# Patient Record
Sex: MALE | Race: WHITE | NOT HISPANIC OR LATINO
[De-identification: names, ages, dates, MRNs, and addresses within clinical notes are randomized per-mention and may not be internally consistent; named-entity substitution may affect disease eponyms.]

---

## 2017-01-03 ENCOUNTER — OTHER (OUTPATIENT)
Age: 32
End: 2017-01-03

## 2017-01-19 ENCOUNTER — OTHER (OUTPATIENT)
Age: 32
End: 2017-01-19

## 2017-05-08 ENCOUNTER — RX RENEWAL (OUTPATIENT)
Age: 32
End: 2017-05-08

## 2017-06-27 ENCOUNTER — LABORATORY RESULT (OUTPATIENT)
Age: 32
End: 2017-06-27

## 2017-06-27 ENCOUNTER — APPOINTMENT (OUTPATIENT)
Dept: SURGERY | Facility: CLINIC | Age: 32
End: 2017-06-27

## 2017-06-28 LAB
T3 SERPL-MCNC: 113 NG/DL
T4 FREE SERPL-MCNC: 1.6 NG/DL
TSH SERPL-ACNC: 0.19 UIU/ML

## 2017-06-29 LAB
THYROGLOB AB SERPL-ACNC: <20 IU/ML
THYROGLOB SERPL-MCNC: 75.2 NG/ML

## 2017-07-07 ENCOUNTER — OTHER (OUTPATIENT)
Age: 32
End: 2017-07-07

## 2017-07-14 ENCOUNTER — OTHER (OUTPATIENT)
Age: 32
End: 2017-07-14

## 2018-01-02 ENCOUNTER — LABORATORY RESULT (OUTPATIENT)
Age: 33
End: 2018-01-02

## 2018-01-02 ENCOUNTER — APPOINTMENT (OUTPATIENT)
Dept: SURGERY | Facility: CLINIC | Age: 33
End: 2018-01-02
Payer: COMMERCIAL

## 2018-01-02 PROCEDURE — 36415 COLL VENOUS BLD VENIPUNCTURE: CPT

## 2018-01-02 PROCEDURE — 99213 OFFICE O/P EST LOW 20 MIN: CPT

## 2018-01-03 LAB
ALBUMIN SERPL ELPH-MCNC: 5.1 G/DL
ALP BLD-CCNC: 94 U/L
ALT SERPL-CCNC: 37 U/L
ANION GAP SERPL CALC-SCNC: 15 MMOL/L
AST SERPL-CCNC: 33 U/L
BASOPHILS # BLD AUTO: 0.01 K/UL
BASOPHILS NFR BLD AUTO: 0.1 %
BILIRUB SERPL-MCNC: 0.2 MG/DL
BUN SERPL-MCNC: 16 MG/DL
CALCIUM SERPL-MCNC: 10.2 MG/DL
CHLORIDE SERPL-SCNC: 101 MMOL/L
CO2 SERPL-SCNC: 25 MMOL/L
CREAT SERPL-MCNC: 1.15 MG/DL
EOSINOPHIL # BLD AUTO: 0.01 K/UL
EOSINOPHIL NFR BLD AUTO: 0.1 %
GLUCOSE SERPL-MCNC: 95 MG/DL
HCT VFR BLD CALC: 47.1 %
HGB BLD-MCNC: 16.2 G/DL
IMM GRANULOCYTES NFR BLD AUTO: 0.1 %
LYMPHOCYTES # BLD AUTO: 1.38 K/UL
LYMPHOCYTES NFR BLD AUTO: 15.8 %
MAN DIFF?: NORMAL
MCHC RBC-ENTMCNC: 31 PG
MCHC RBC-ENTMCNC: 34.4 GM/DL
MCV RBC AUTO: 90.2 FL
MONOCYTES # BLD AUTO: 0.44 K/UL
MONOCYTES NFR BLD AUTO: 5.1 %
NEUTROPHILS # BLD AUTO: 6.86 K/UL
NEUTROPHILS NFR BLD AUTO: 78.8 %
PLATELET # BLD AUTO: 315 K/UL
POTASSIUM SERPL-SCNC: 4.2 MMOL/L
PROT SERPL-MCNC: 7.8 G/DL
RBC # BLD: 5.22 M/UL
RBC # FLD: 13.2 %
SODIUM SERPL-SCNC: 141 MMOL/L
T3 SERPL-MCNC: 109 NG/DL
T4 FREE SERPL-MCNC: 1.5 NG/DL
TSH SERPL-ACNC: 0.5 UIU/ML
WBC # FLD AUTO: 8.71 K/UL

## 2018-01-04 LAB
THYROGLOB AB SERPL-ACNC: <20 IU/ML
THYROGLOB SERPL-MCNC: 81.7 NG/ML

## 2018-01-22 ENCOUNTER — OTHER (OUTPATIENT)
Age: 33
End: 2018-01-22

## 2018-04-03 ENCOUNTER — RX RENEWAL (OUTPATIENT)
Age: 33
End: 2018-04-03

## 2018-07-02 ENCOUNTER — RX RENEWAL (OUTPATIENT)
Age: 33
End: 2018-07-02

## 2018-07-10 ENCOUNTER — LABORATORY RESULT (OUTPATIENT)
Age: 33
End: 2018-07-10

## 2018-07-10 ENCOUNTER — APPOINTMENT (OUTPATIENT)
Dept: SURGERY | Facility: CLINIC | Age: 33
End: 2018-07-10
Payer: COMMERCIAL

## 2018-07-10 PROCEDURE — 36415 COLL VENOUS BLD VENIPUNCTURE: CPT

## 2018-07-10 PROCEDURE — 99213 OFFICE O/P EST LOW 20 MIN: CPT

## 2018-07-12 LAB
T3 SERPL-MCNC: 108 NG/DL
T4 FREE SERPL-MCNC: 1.5 NG/DL
THYROGLOB AB SERPL-ACNC: <20 IU/ML
THYROGLOB SERPL-MCNC: 61.4 NG/ML
TSH SERPL-ACNC: 1.04 UIU/ML

## 2018-07-23 ENCOUNTER — OTHER (OUTPATIENT)
Age: 33
End: 2018-07-23

## 2018-12-17 ENCOUNTER — RX RENEWAL (OUTPATIENT)
Age: 33
End: 2018-12-17

## 2019-01-29 ENCOUNTER — LABORATORY RESULT (OUTPATIENT)
Age: 34
End: 2019-01-29

## 2019-01-29 ENCOUNTER — APPOINTMENT (OUTPATIENT)
Dept: SURGERY | Facility: CLINIC | Age: 34
End: 2019-01-29
Payer: COMMERCIAL

## 2019-01-29 PROCEDURE — 99213 OFFICE O/P EST LOW 20 MIN: CPT

## 2019-01-29 PROCEDURE — 36415 COLL VENOUS BLD VENIPUNCTURE: CPT

## 2019-01-29 NOTE — PHYSICAL EXAM
[de-identified] : changes from prior surgery [de-identified] : no palpable thyroid bed nodules [Laryngoscopy Performed] : laryngoscopy was performed, see procedure section for findings [Midline] : located in midline position [Normal] : orientation to person, place, and time: normal [de-identified] : 1.2 cm right upper jugular lymph node, well circumscribed and mobile and soft and stable

## 2019-01-29 NOTE — ASSESSMENT
[FreeTextEntry1] : bloods drawn. CT chest and sonogram next visit. bloods drawn. to call next week for results.  to return earlier if any change

## 2019-01-29 NOTE — HISTORY OF PRESENT ILLNESS
[de-identified] : 4   years s/p total thyroidectomy with bilateral neck dissection for malignancy. feels well on Synthroid 112 mcg 7 1/2 doses weekly. denies dysphagia, hoarseness or new lesions. no changes medically since last visit.  last CT showed stable pulmonary nodules

## 2019-01-31 LAB
CHOLEST SERPL-MCNC: 188 MG/DL
CHOLEST/HDLC SERPL: 3.5 RATIO
HDLC SERPL-MCNC: 54 MG/DL
LDLC SERPL CALC-MCNC: 119 MG/DL
T3 SERPL-MCNC: 122 NG/DL
T4 FREE SERPL-MCNC: 1.8 NG/DL
THYROGLOB AB SERPL-ACNC: <20 IU/ML
THYROGLOB SERPL-MCNC: 89.2 NG/ML
TRIGL SERPL-MCNC: 73 MG/DL
TSH SERPL-ACNC: 0.67 UIU/ML

## 2019-02-01 ENCOUNTER — RESULT REVIEW (OUTPATIENT)
Age: 34
End: 2019-02-01

## 2019-07-30 ENCOUNTER — LABORATORY RESULT (OUTPATIENT)
Age: 34
End: 2019-07-30

## 2019-07-30 ENCOUNTER — APPOINTMENT (OUTPATIENT)
Dept: SURGERY | Facility: CLINIC | Age: 34
End: 2019-07-30
Payer: COMMERCIAL

## 2019-07-30 PROCEDURE — 36415 COLL VENOUS BLD VENIPUNCTURE: CPT

## 2019-07-30 PROCEDURE — 99213 OFFICE O/P EST LOW 20 MIN: CPT

## 2019-07-30 NOTE — ASSESSMENT
[FreeTextEntry1] : will observe.  bloods drawn. CT chest and sonogram requested. to call next week for results.  to return earlier if any change

## 2019-07-30 NOTE — PHYSICAL EXAM
[de-identified] : changes from prior surgery [de-identified] : no palpable thyroid bed nodules [Laryngoscopy Performed] : laryngoscopy was performed, see procedure section for findings [Midline] : located in midline position [de-identified] : 1.2 cm right upper jugular lymph node, well circumscribed and mobile and soft and stable [Normal] : cranial nerves 2-12 intact

## 2019-07-30 NOTE — HISTORY OF PRESENT ILLNESS
[de-identified] : 4  1/2   years s/p total thyroidectomy with bilateral neck dissection for malignancy. feels well on Synthroid 112 mcg 7 1/2 doses weekly. denies dysphagia, hoarseness or new lesions. no changes medically since last visit.  last CT showed stable pulmonary nodules

## 2019-07-31 LAB
T3 SERPL-MCNC: 117 NG/DL
T4 FREE SERPL-MCNC: 1.4 NG/DL
THYROGLOB AB SERPL-ACNC: <20 IU/ML
THYROGLOB SERPL-MCNC: 84.5 NG/ML
TSH SERPL-ACNC: 1.41 UIU/ML

## 2019-08-12 ENCOUNTER — OTHER (OUTPATIENT)
Age: 34
End: 2019-08-12

## 2019-08-23 ENCOUNTER — RX RENEWAL (OUTPATIENT)
Age: 34
End: 2019-08-23

## 2020-02-04 ENCOUNTER — APPOINTMENT (OUTPATIENT)
Dept: SURGERY | Facility: CLINIC | Age: 35
End: 2020-02-04
Payer: COMMERCIAL

## 2020-02-04 ENCOUNTER — LABORATORY RESULT (OUTPATIENT)
Age: 35
End: 2020-02-04

## 2020-02-04 PROCEDURE — 99213 OFFICE O/P EST LOW 20 MIN: CPT

## 2020-02-04 PROCEDURE — 36415 COLL VENOUS BLD VENIPUNCTURE: CPT

## 2020-02-04 NOTE — PHYSICAL EXAM
[de-identified] : no palpable thyroid bed nodules [de-identified] : changes from prior surgery [Laryngoscopy Performed] : laryngoscopy was performed, see procedure section for findings [Midline] : located in midline position [de-identified] : 1.2 cm right upper jugular lymph node, well circumscribed and mobile and soft and stable [Normal] : orientation to person, place, and time: normal

## 2020-02-04 NOTE — HISTORY OF PRESENT ILLNESS
[de-identified] : 5   years s/p total thyroidectomy with bilateral neck dissection for malignancy. feels well on Synthroid 112 mcg 7 1/2 doses weekly. denies dysphagia, hoarseness or new lesions. no changes medically since last visit.  last CT showed stable pulmonary nodules

## 2020-02-04 NOTE — ASSESSMENT
[FreeTextEntry1] : will observe.  bloods drawn. CT chest next visit. to call next week for results.  to return earlier if any change

## 2020-02-05 LAB
T3 SERPL-MCNC: 111 NG/DL
T4 FREE SERPL-MCNC: 1.5 NG/DL
THYROGLOB AB SERPL-ACNC: <20 IU/ML
THYROGLOB SERPL-MCNC: 82.7 NG/ML
TSH SERPL-ACNC: 1.41 UIU/ML

## 2020-05-18 LAB
T3 SERPL-MCNC: 108 NG/DL
T4 FREE SERPL-MCNC: 1.2 NG/DL
TSH SERPL-ACNC: 5.07 UIU/ML

## 2020-05-19 LAB
THYROGLOB AB SERPL-ACNC: <20 IU/ML
THYROGLOB AB SERPL-ACNC: <20 IU/ML
THYROGLOB SERPL-MCNC: 66.7 NG/ML
THYROPEROXIDASE AB SERPL IA-ACNC: <10 IU/ML

## 2020-06-19 LAB — T3 SERPL-MCNC: 101 NG/DL

## 2020-06-21 ENCOUNTER — LABORATORY RESULT (OUTPATIENT)
Age: 35
End: 2020-06-21

## 2020-06-25 LAB
T4 FREE SERPL-MCNC: 1.6 NG/DL
THYROGLOB AB SERPL-ACNC: <20 IU/ML
THYROGLOB SERPL-MCNC: 84.4 NG/ML
TSH SERPL-ACNC: 2.43 UIU/ML

## 2020-08-04 ENCOUNTER — APPOINTMENT (OUTPATIENT)
Dept: SURGERY | Facility: CLINIC | Age: 35
End: 2020-08-04
Payer: COMMERCIAL

## 2020-08-04 PROCEDURE — 99213 OFFICE O/P EST LOW 20 MIN: CPT

## 2020-08-04 NOTE — ASSESSMENT
[FreeTextEntry1] : will observe.  will increase Synthroid to 137 mcg daily.   bloods 6 weeks. to call next week for results.  to return earlier if any change

## 2020-08-04 NOTE — PHYSICAL EXAM
[de-identified] : no palpable thyroid bed nodules [de-identified] : changes from prior surgery [Midline] : located in midline position [Laryngoscopy Performed] : laryngoscopy was performed, see procedure section for findings [Normal] : cranial nerves 2-12 intact [de-identified] : 1.2 cm right upper jugular lymph node, well circumscribed and mobile and soft and stable

## 2020-08-04 NOTE — HISTORY OF PRESENT ILLNESS
[de-identified] : 5 1/2  years s/p total thyroidectomy with bilateral neck dissection for malignancy. feels well on Synthroid 125 mcg daily. denies dysphagia, hoarseness or new lesions. no changes medically since last visit.  recent CT showed stable pulmonary nodules

## 2020-10-03 ENCOUNTER — LABORATORY RESULT (OUTPATIENT)
Age: 35
End: 2020-10-03

## 2020-10-05 LAB
T3 SERPL-MCNC: 124 NG/DL
T4 FREE SERPL-MCNC: 1.8 NG/DL
THYROGLOB AB SERPL-ACNC: <20 IU/ML
THYROGLOB SERPL-MCNC: 69.4 NG/ML
TSH SERPL-ACNC: 0.1 UIU/ML

## 2021-02-09 ENCOUNTER — LABORATORY RESULT (OUTPATIENT)
Age: 36
End: 2021-02-09

## 2021-02-09 ENCOUNTER — APPOINTMENT (OUTPATIENT)
Dept: SURGERY | Facility: CLINIC | Age: 36
End: 2021-02-09
Payer: MEDICARE

## 2021-02-09 PROCEDURE — 99214 OFFICE O/P EST MOD 30 MIN: CPT

## 2021-02-09 PROCEDURE — 36415 COLL VENOUS BLD VENIPUNCTURE: CPT

## 2021-02-09 NOTE — ASSESSMENT
[FreeTextEntry1] : will observe.  bloods drawn. to call next week for results.  to return earlier if any change.  chest CT next visit

## 2021-02-09 NOTE — HISTORY OF PRESENT ILLNESS
[de-identified] : 6  years s/p total thyroidectomy with bilateral neck dissection for malignancy. feels well on Synthroid 137 mcg daily. denies dysphagia, hoarseness or new lesions. no changes medically since last visit.  last CT showed stable pulmonary nodules.  I have reviewed all old and new data and available images.  Additional information was obtained from others present at the time of visit to ensure the completeness of the history\par

## 2021-02-09 NOTE — PHYSICAL EXAM
[de-identified] : changes from prior surgery [de-identified] : no palpable thyroid bed nodules [Laryngoscopy Performed] : laryngoscopy was performed, see procedure section for findings [Midline] : located in midline position [Normal] : orientation to person, place, and time: normal [de-identified] : 1.2 cm right upper jugular lymph node, well circumscribed and mobile and soft and stable

## 2021-02-10 LAB
T3 SERPL-MCNC: 124 NG/DL
T4 FREE SERPL-MCNC: 1.9 NG/DL
THYROGLOB AB SERPL-ACNC: <20 IU/ML
THYROGLOB SERPL-MCNC: 60.7 NG/ML
TSH SERPL-ACNC: 0.03 UIU/ML

## 2021-02-12 ENCOUNTER — NON-APPOINTMENT (OUTPATIENT)
Age: 36
End: 2021-02-12

## 2021-03-23 ENCOUNTER — NON-APPOINTMENT (OUTPATIENT)
Age: 36
End: 2021-03-23

## 2021-05-03 ENCOUNTER — RX RENEWAL (OUTPATIENT)
Age: 36
End: 2021-05-03

## 2021-08-10 ENCOUNTER — APPOINTMENT (OUTPATIENT)
Dept: SURGERY | Facility: CLINIC | Age: 36
End: 2021-08-10
Payer: MEDICARE

## 2021-08-10 ENCOUNTER — LABORATORY RESULT (OUTPATIENT)
Age: 36
End: 2021-08-10

## 2021-08-10 PROCEDURE — 36415 COLL VENOUS BLD VENIPUNCTURE: CPT

## 2021-08-10 PROCEDURE — 99214 OFFICE O/P EST MOD 30 MIN: CPT | Mod: 25

## 2021-08-10 RX ORDER — CLINDAMYCIN HYDROCHLORIDE 150 MG/1
150 CAPSULE ORAL
Qty: 29 | Refills: 0 | Status: ACTIVE | COMMUNITY
Start: 2021-07-14

## 2021-08-10 NOTE — HISTORY OF PRESENT ILLNESS
[de-identified] : 6 1/2  years s/p total thyroidectomy with bilateral neck dissection for malignancy. feels well on Synthroid 137 mcg daily. denies dysphagia, hoarseness or new lesions. no changes medically since last visit.  recent CT showed stable pulmonary nodules.  I have reviewed all old and new data and available images.  Additional information was obtained from others present at the time of visit to ensure the completeness of the history\par

## 2021-08-10 NOTE — PHYSICAL EXAM
[Laryngoscopy Performed] : laryngoscopy was performed, see procedure section for findings [Midline] : located in midline position [Normal] : orientation to person, place, and time: normal [de-identified] : changes from prior surgery [de-identified] : no palpable thyroid bed nodules [de-identified] : 1.2 cm right upper jugular lymph node, well circumscribed and mobile and soft and stable

## 2021-08-10 NOTE — ASSESSMENT
[FreeTextEntry1] : will observe.  bloods drawn. to call next week for results.  to return earlier if any change.  patient has been given the opportunity to ask questions, and all of the patient's questions have been answered to their satisfaction\par

## 2021-08-11 LAB
T3 SERPL-MCNC: 134 NG/DL
T4 FREE SERPL-MCNC: 1.7 NG/DL
TSH SERPL-ACNC: 0.01 UIU/ML

## 2021-08-12 LAB
THYROGLOB AB SERPL-ACNC: <20 IU/ML
THYROGLOB SERPL-MCNC: 52 NG/ML

## 2021-08-13 ENCOUNTER — NON-APPOINTMENT (OUTPATIENT)
Age: 36
End: 2021-08-13

## 2022-02-17 ENCOUNTER — LABORATORY RESULT (OUTPATIENT)
Age: 37
End: 2022-02-17

## 2022-02-17 ENCOUNTER — APPOINTMENT (OUTPATIENT)
Dept: SURGERY | Facility: CLINIC | Age: 37
End: 2022-02-17
Payer: MEDICARE

## 2022-02-17 LAB
T3 SERPL-MCNC: 119 NG/DL
T4 FREE SERPL-MCNC: 2 NG/DL
TSH SERPL-ACNC: 0.02 UIU/ML

## 2022-02-17 PROCEDURE — 36415 COLL VENOUS BLD VENIPUNCTURE: CPT

## 2022-02-17 PROCEDURE — 99214 OFFICE O/P EST MOD 30 MIN: CPT | Mod: 25

## 2022-02-17 NOTE — HISTORY OF PRESENT ILLNESS
[de-identified] : 7  years s/p total thyroidectomy with bilateral neck dissection for malignancy. feels well on Synthroid 137 mcg daily. denies dysphagia, hoarseness or new lesions. no changes medically since last visit.  last CT showed stable pulmonary nodules.  I have reviewed all old and new data and available images.  Additional information was obtained from others present at the time of visit to ensure the completeness of the history\par

## 2022-02-17 NOTE — ASSESSMENT
[FreeTextEntry1] : will observe.  bloods drawn. to call next week for results.  to return earlier if any change.  patient has been given the opportunity to ask questions, and all of the patient's questions have been answered to their satisfaction.  chest CT next visit\par

## 2022-02-17 NOTE — PHYSICAL EXAM
[de-identified] : changes from prior surgery [de-identified] : no palpable thyroid bed nodules [Laryngoscopy Performed] : laryngoscopy was performed, see procedure section for findings [Midline] : located in midline position [Normal] : orientation to person, place, and time: normal [de-identified] : 1.2 cm right upper jugular lymph node, well circumscribed and mobile and soft and stable

## 2022-02-18 LAB
THYROGLOB AB SERPL-ACNC: <20 IU/ML
THYROGLOB SERPL-MCNC: 55.8 NG/ML

## 2022-02-22 ENCOUNTER — NON-APPOINTMENT (OUTPATIENT)
Age: 37
End: 2022-02-22

## 2022-08-04 RX ORDER — LEVOTHYROXINE SODIUM 0.11 MG/1
112 TABLET ORAL DAILY
Qty: 90 | Refills: 2 | Status: COMPLETED | COMMUNITY
Start: 2018-07-02 | End: 2022-08-04

## 2022-08-04 RX ORDER — LEVOTHYROXINE SODIUM 0.12 MG/1
125 TABLET ORAL DAILY
Qty: 90 | Refills: 3 | Status: COMPLETED | COMMUNITY
Start: 2020-02-11 | End: 2022-08-04

## 2022-08-16 ENCOUNTER — LABORATORY RESULT (OUTPATIENT)
Age: 37
End: 2022-08-16

## 2022-08-16 ENCOUNTER — APPOINTMENT (OUTPATIENT)
Dept: SURGERY | Facility: CLINIC | Age: 37
End: 2022-08-16

## 2022-08-16 PROCEDURE — 36415 COLL VENOUS BLD VENIPUNCTURE: CPT

## 2022-08-16 PROCEDURE — 99214 OFFICE O/P EST MOD 30 MIN: CPT | Mod: 25

## 2022-08-16 RX ORDER — NIRMATRELVIR AND RITONAVIR 300-100 MG
20 X 150 MG & KIT ORAL
Qty: 30 | Refills: 0 | Status: ACTIVE | COMMUNITY
Start: 2022-08-06

## 2022-08-16 NOTE — HISTORY OF PRESENT ILLNESS
[de-identified] : 7 1/2  years s/p total thyroidectomy with bilateral neck dissection for malignancy. feels well on Synthroid 137 mcg daily. denies dysphagia, hoarseness or new lesions. no changes medically since last visit.  recent CT showed stable pulmonary nodules.  I have reviewed all old and new data and available images.  Additional information was obtained from others present at the time of visit to ensure the completeness of the history.  fully recovered from recent Covid infection\par

## 2022-08-16 NOTE — PHYSICAL EXAM
[de-identified] : changes from prior surgery [de-identified] : no palpable thyroid bed nodules [Laryngoscopy Performed] : laryngoscopy was performed, see procedure section for findings [Midline] : located in midline position [Normal] : orientation to person, place, and time: normal [de-identified] : 1.2 cm right upper jugular lymph node, well circumscribed and mobile and soft and stable

## 2022-08-16 NOTE — ASSESSMENT
[FreeTextEntry1] : will observe.  bloods drawn. to call next week for results.  to return earlier if any change.  patient has been given the opportunity to ask questions, and all of the patient's questions have been answered to their satisfaction.  chest CT next year\par

## 2022-08-17 LAB
T3 SERPL-MCNC: 90 NG/DL
T4 FREE SERPL-MCNC: 1.3 NG/DL
THYROGLOB AB SERPL-ACNC: <20 IU/ML
THYROGLOB SERPL-MCNC: 77.8 NG/ML
TSH SERPL-ACNC: 4.27 UIU/ML

## 2022-08-18 ENCOUNTER — NON-APPOINTMENT (OUTPATIENT)
Age: 37
End: 2022-08-18

## 2022-10-22 ENCOUNTER — LABORATORY RESULT (OUTPATIENT)
Age: 37
End: 2022-10-22

## 2022-10-24 ENCOUNTER — NON-APPOINTMENT (OUTPATIENT)
Age: 37
End: 2022-10-24

## 2022-10-24 LAB
T3 SERPL-MCNC: 98 NG/DL
T4 FREE SERPL-MCNC: 1.6 NG/DL
THYROGLOB AB SERPL-ACNC: <20 IU/ML
THYROGLOB SERPL-MCNC: 61.5 NG/ML
TSH SERPL-ACNC: 0.48 UIU/ML

## 2022-10-26 ENCOUNTER — NON-APPOINTMENT (OUTPATIENT)
Age: 37
End: 2022-10-26

## 2023-02-28 ENCOUNTER — APPOINTMENT (OUTPATIENT)
Dept: SURGERY | Facility: CLINIC | Age: 38
End: 2023-02-28
Payer: MEDICARE

## 2023-02-28 ENCOUNTER — LABORATORY RESULT (OUTPATIENT)
Age: 38
End: 2023-02-28

## 2023-02-28 DIAGNOSIS — Z00.00 ENCOUNTER FOR GENERAL ADULT MEDICAL EXAMINATION W/OUT ABNORMAL FINDINGS: ICD-10-CM

## 2023-02-28 PROCEDURE — 99214 OFFICE O/P EST MOD 30 MIN: CPT | Mod: 25

## 2023-02-28 PROCEDURE — 36415 COLL VENOUS BLD VENIPUNCTURE: CPT

## 2023-02-28 NOTE — HISTORY OF PRESENT ILLNESS
[de-identified] : 8   years s/p total thyroidectomy with bilateral neck dissection for malignancy. feels well on Synthroid 137 mcg   7 1/2 doses weekly. denies dysphagia, hoarseness or new lesions. no changes medically since last visit.  last CT showed stable pulmonary nodules.  I have reviewed all old and new data and available images.  Additional information was obtained from others present at the time of visit to ensure the completeness of the history.

## 2023-02-28 NOTE — PHYSICAL EXAM
[de-identified] : changes from prior surgery [de-identified] : no palpable thyroid bed nodules [Laryngoscopy Performed] : laryngoscopy was performed, see procedure section for findings [Midline] : located in midline position [Normal] : orientation to person, place, and time: normal [de-identified] : 1.2 cm right upper jugular lymph node, well circumscribed and mobile and soft and stable

## 2023-03-01 LAB
T3 SERPL-MCNC: 111 NG/DL
T4 FREE SERPL-MCNC: 1.5 NG/DL
THYROGLOB AB SERPL-ACNC: <20 IU/ML
THYROGLOB SERPL-MCNC: 49.8 NG/ML
TSH SERPL-ACNC: 1.14 UIU/ML

## 2023-03-07 ENCOUNTER — NON-APPOINTMENT (OUTPATIENT)
Age: 38
End: 2023-03-07

## 2023-04-03 ENCOUNTER — RX RENEWAL (OUTPATIENT)
Age: 38
End: 2023-04-03

## 2023-08-08 ENCOUNTER — LABORATORY RESULT (OUTPATIENT)
Age: 38
End: 2023-08-08

## 2023-08-08 ENCOUNTER — APPOINTMENT (OUTPATIENT)
Dept: SURGERY | Facility: CLINIC | Age: 38
End: 2023-08-08
Payer: MEDICARE

## 2023-08-08 PROCEDURE — 99214 OFFICE O/P EST MOD 30 MIN: CPT | Mod: 25

## 2023-08-08 PROCEDURE — 36415 COLL VENOUS BLD VENIPUNCTURE: CPT

## 2023-08-08 NOTE — PHYSICAL EXAM
[de-identified] : changes from prior surgery [de-identified] : no palpable thyroid bed nodules [Laryngoscopy Performed] : laryngoscopy was performed, see procedure section for findings [Midline] : located in midline position [Normal] : orientation to person, place, and time: normal [de-identified] : 1.2 cm right upper jugular lymph node, well circumscribed and mobile and soft and stable

## 2023-08-08 NOTE — HISTORY OF PRESENT ILLNESS
[de-identified] : 8 1/2   years s/p total thyroidectomy with bilateral neck dissection for malignancy. feels well on Synthroid 137 mcg   7 1/2 doses weekly. denies dysphagia, hoarseness or new lesions. no changes medically since last visit.  recent CT showed stable pulmonary nodules, osteopenia.  I have reviewed all old and new data and available images.  Additional information was obtained from others present at the time of visit to ensure the completeness of the history.

## 2023-08-08 NOTE — ASSESSMENT
[FreeTextEntry1] : will observe.  bloods drawn. to call next week for results.  to return earlier if any change.  patient has been given the opportunity to ask questions, and all of the patient's questions have been answered to their satisfaction.  chest CT next year. DEXA requested. to call next week for results.  asked about seeing endocrinologist. discussed is reasonable idea in view of osteopenia

## 2023-08-09 LAB
25(OH)D3 SERPL-MCNC: 38.4 NG/ML
CALCIUM SERPL-MCNC: 10 MG/DL
CALCIUM SERPL-MCNC: 10 MG/DL
PARATHYROID HORMONE INTACT: 25 PG/ML
T3 SERPL-MCNC: 110 NG/DL
T4 FREE SERPL-MCNC: 1.5 NG/DL
THYROGLOB AB SERPL-ACNC: <20 IU/ML
THYROGLOB SERPL-MCNC: 42.3 NG/ML
TSH SERPL-ACNC: 1.94 UIU/ML

## 2023-09-06 ENCOUNTER — APPOINTMENT (OUTPATIENT)
Dept: ENDOCRINOLOGY | Facility: CLINIC | Age: 38
End: 2023-09-06
Payer: MEDICARE

## 2023-09-06 VITALS
OXYGEN SATURATION: 99 % | WEIGHT: 130 LBS | BODY MASS INDEX: 21.66 KG/M2 | HEART RATE: 93 BPM | DIASTOLIC BLOOD PRESSURE: 76 MMHG | SYSTOLIC BLOOD PRESSURE: 100 MMHG | HEIGHT: 65 IN

## 2023-09-06 DIAGNOSIS — M81.0 AGE-RELATED OSTEOPOROSIS W/OUT CURRENT PATHOLOGICAL FRACTURE: ICD-10-CM

## 2023-09-06 LAB
ALBUMIN SERPL ELPH-MCNC: 5 G/DL
ALP BLD-CCNC: 102 U/L
ALT SERPL-CCNC: 29 U/L
ANION GAP SERPL CALC-SCNC: 14 MMOL/L
AST SERPL-CCNC: 28 U/L
BILIRUB SERPL-MCNC: 0.5 MG/DL
BUN SERPL-MCNC: 16 MG/DL
CALCIUM SERPL-MCNC: 9.8 MG/DL
CHLORIDE SERPL-SCNC: 101 MMOL/L
CO2 SERPL-SCNC: 25 MMOL/L
CREAT SERPL-MCNC: 1.03 MG/DL
EGFR: 96 ML/MIN/1.73M2
GLUCOSE SERPL-MCNC: 80 MG/DL
POTASSIUM SERPL-SCNC: 4.4 MMOL/L
PROT SERPL-MCNC: 7.2 G/DL
SODIUM SERPL-SCNC: 139 MMOL/L

## 2023-09-06 PROCEDURE — 99205 OFFICE O/P NEW HI 60 MIN: CPT

## 2023-09-06 NOTE — ASSESSMENT
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   1. Thyroid Cancer:  Surgery: Right lobectomy 2014 for 3.8cm unifocal papillary thyroid cancer with lymph node involvement and extension into the perithyroidal soft tissue and muscle (pT4a p N1B Mx). Left lobectomy on 2014 showing papillary carcinoma, extending to perithyroidal soft tissue and muscle. 2014 right neck lymph node excision which showed metastatic papillary carcinoma with carcinomatosis completely replacing lymph node. Surgeries performed by Dr. Ya. MARSHA Risk: high risk with incomplete structural and biochemical response Stage: pT4a p N1B M1 DOOLEY History: 206 mCI in  Surveillance:  T/23 Tg Ab <20, Tg 42.3, TSH 1.94, FT4 1.5, TT3 110 Imagin2016 NM uptake: no local/distant iodine avid tissue unchanged from , stimulated Tg 213,   2016 PET with stable bilateral pulmonary nodules  CT chest: numerous stable lung nodules PLAN:  - continue levothyroxine 137 x 5 days a week and 1.5 tablets x 2 days a week (recently increased), goal TSH <0.5 - yearly CT chest to monitor lung nodules, Check Tg levels every 6-12 months  - will continue to monitor stable, asymptomatic disease at this time without need for additional treatment currently  2. Postsurgical Hypothyroidism  TSH goal: Can keep 0.1-0.5 given osteoporosis (otherwise would have kept <0.1 for structurally incomplete response to therapy).  - on levothyroxine 137 mcg 5 days per week and 1.5 tablets 2 days a week, recently increased -  TSH 1.94, FT4 1.5, TT3 110 - clinically euthyroid - goal TSH <0.5 PLAN:  - continue levothyroxine 137 mcg 5 days per week and 1.5 tablets 2 days a week - we discussed the importance of medication hygiene: Take levothyroxine with water on an empty stomach, at least 30 minutes before any food/beverages or other medication intake.  - repeat TSH and free T4 every 6-12 months   3. Osteoporosis  - DEXA Scan:  spine -3.1, left femoral neck -1.7, total left femur -1.7, right femoral neck -2.4, total right femur -1.9 - CT  with mild osteopenia, moderate degenerative change of thoracic spine, mild compression deformities of the mid thoracic vertebra with increase in thoracic kyphosis -  25 vitamin D 38.4, Ca 10, PTH 25 - risk factors: TSH suppression for thyroid cancer  - patient reports he will be undergoing extensive gum surgery this weekend PLAN - check CMP today - can continue calcium/vitamin D supplements, continue weight bearing exercises  - discussed risks/benefits of Fosamax or Reclast assuming he will have normal renal function on today's labs, will need clearance from dentist once invasive dental procedures are complete. Fosamax may be a better option if needs dental procedures frequently, and also given his young age. They will update us after visits with dentist.   RTC in 6 months.  Case seen and discussed with Dr. Pacheco.  Arpan Corona MD Endocrinology Fellow  Alicia Pacheco MD [facility] at [address], [address] Ph: [phone] Fax: [phone]

## 2023-09-06 NOTE — REVIEW OF SYSTEMS
[Constipation] : constipation [Fatigue] : no fatigue [Recent Weight Gain (___ Lbs)] : no recent weight gain [Recent Weight Loss (___ Lbs)] : no recent weight loss [Dry Eyes] : no dryness [Blurred Vision] : no blurred vision [Dysphagia] : no dysphagia [Neck Pain] : no neck pain [Dysphonia] : no dysphonia [Chest Pain] : no chest pain [Palpitations] : no palpitations [Lower Ext Edema] : no lower extremity edema [Shortness Of Breath] : no shortness of breath [Cough] : no cough [Nausea] : no nausea [Abdominal Pain] : no abdominal pain [Vomiting] : no vomiting [Diarrhea] : no diarrhea [Polyuria] : no polyuria [Muscle Weakness] : no muscle weakness [Myalgia] : no myalgia  [Dry Skin] : no dry skin [Hair Loss] : no hair loss [Headaches] : no headaches [Tremors] : no tremors [Pain/Numbness of Digits] : no pain/numbness of digits [Cold Intolerance] : no cold intolerance [Heat Intolerance] : no heat intolerance

## 2023-09-06 NOTE — REASON FOR VISIT
[Initial Evaluation] : an initial evaluation [Osteoporosis] : osteoporosis [Thyroid Cancer] : thyroid cancer [FreeTextEntry2] : Dr. Ya

## 2023-09-06 NOTE — HISTORY OF PRESENT ILLNESS
[FreeTextEntry1] : CHIEF COMPLAINT: Thyroid Cancer, Osteoporosis REFERRED BY: Dr. Wyatt Ya  HISTORY OF PRESENTING ILLNESS: The patient is a 38 year old with a PMHx of papillary thyroid carcinoma s/p bilateral neck dissection () and DOOLEY with 206mci () being seen in the office today for evaluation of thyroid cancer and osteoporosis.   Initially discovered nodule: Reports always had a large neck, in 2014, had a cold, referred by his PCP to get US of neck showing nodules and swelling.  Surgery: Right lobectomy 2014 for 3.8cm unifocal papillary thyroid cancer with lymph node involvement and extension into the perithyroidal soft tissue and muscle (pT4a p N1B Mx). Left lobectomy on 2014 showing papillary carcinoma, extending to perithyroidal soft tissue and muscle. 2014 right neck lymph node excision which showed metastatic papillary carcinoma with carcinomatosis completely replacing lymph node. Surgeries performed by Dr. Ya. Formal surgical pathology reports unavailable to me at this time, information obtained from chart.    MARSHA Risk: high risk with incomplete biochemical and structural response AJCC 8th edition TNM Stage: [id number] DOOLEY Treatment: 206 mci in   Surveillance:  T/23 Tg Ab <20, Tg 42.3. Tg levels have been stable/declining.  Imagin2016 NM uptake showed no uptake neck/distant sites, unchanged from 10/17/2015, stimulated Tg 213,  2016 PET with stable bilateral pulmonary nodules  CT chest: numerous stable lung nodules  Postsurgical Hypothyroidism:   TSH 1.94, FT4 1.5, TT3 110 He is currently taking 137 mcg 5 days a week (Mon, Tue, Thu, Fri, Sat), 1.5 tablets on Wednesday and  of levothyroxine. Reports compliance with medication.  He is taking it appropriately, on an empty stomach at least 30 minutes before food.  Denies any symptoms of hypo or hyperthyroidism at this time except may be constipated which has been going on for years.  No family history of thyroid cancer or thyroid disease.  No personal history of radiation exposure to the head and neck area.    OSTEOPOROSIS HPI:  DEXA Scan:  spine -3.1, left femoral neck -1.7, total left femur -1.7, right femoral neck -2.4, total right femur -1.9 CT  with mild osteopenia, moderate degenerative change of thoracic spine, mild compression deformities of the mid thoracic vertebra with increase in thoracic kyphosis History of height loss: no History of fractures: no History of falls: no Family history of hip fracture in parent: grandmother with osteoporosis  Glucocorticoid use: denies Rheumatoid arthritis: denies Alcohol intake: very rarely Current smoker: denies  Calcium intake: citrical 400mg daily -Diet: 2 eggs, salad, meat, vegetables, fruit, cheese, cereal, milk (2 mugs a day) -Supplement: mens multivitamins Vitamin D intake: citrical has vitamin D Exercise: walks, yard work Dental Issues: reports have lots of dental issues, has had root canals, crowns, reports will be having a gum procedure on Saturday, had restorative surgery on front teeth this year  History of kidney stones: denies  Thyroid disease: thyroid cancer Parathyroid disease: denies  Prior osteoporosis treatment: denies prior

## 2023-09-06 NOTE — PHYSICAL EXAM
[Alert] : alert [No Acute Distress] : no acute distress [Normal Sclera/Conjunctiva] : normal sclera/conjunctiva [No Proptosis] : no proptosis [No Neck Mass] : no neck mass was observed [Well Healed Scar] : well healed scar [No Respiratory Distress] : no respiratory distress [No Accessory Muscle Use] : no accessory muscle use [Clear to Auscultation] : lungs were clear to auscultation bilaterally [Normal S1, S2] : normal S1 and S2 [Regular Rhythm] : with a regular rhythm [Normal Bowel Sounds] : normal bowel sounds [Not Tender] : non-tender [Soft] : abdomen soft [No Stigmata of Cushings Syndrome] : no stigmata of Cushings Syndrome [No Involuntary Movements] : no involuntary movements were seen [Normal Strength/Tone] : muscle strength and tone were normal [No Skin Lesions] : no skin lesions [No Motor Deficits] : the motor exam was normal [No Tremors] : no tremors [Normal Affect] : the affect was normal [Normal Mood] : the mood was normal [Acanthosis Nigricans] : no acanthosis nigricans [de-identified] : right submandibular lymph node palpated

## 2023-10-23 ENCOUNTER — NON-APPOINTMENT (OUTPATIENT)
Age: 38
End: 2023-10-23

## 2023-10-23 LAB
T3 SERPL-MCNC: 121 NG/DL
T4 FREE SERPL-MCNC: 2.2 NG/DL
TSH SERPL-ACNC: 0.03 UIU/ML

## 2023-10-24 LAB
THYROGLOB AB SERPL-ACNC: <1 IU/ML
THYROPEROXIDASE AB SERPL IA-ACNC: 10 IU/ML

## 2023-10-25 ENCOUNTER — NON-APPOINTMENT (OUTPATIENT)
Age: 38
End: 2023-10-25

## 2023-11-06 ENCOUNTER — NON-APPOINTMENT (OUTPATIENT)
Age: 38
End: 2023-11-06

## 2023-11-06 LAB
THYROGLOB AB SERPL-ACNC: <1 IU/ML
THYROGLOB SERPL-MCNC: 22.2 NG/ML

## 2024-02-19 ENCOUNTER — NON-APPOINTMENT (OUTPATIENT)
Age: 39
End: 2024-02-19

## 2024-02-20 ENCOUNTER — NON-APPOINTMENT (OUTPATIENT)
Age: 39
End: 2024-02-20

## 2024-02-20 LAB
T3 SERPL-MCNC: 88 NG/DL
T4 FREE SERPL-MCNC: 1.6 NG/DL
THYROGLOB AB SERPL-ACNC: <20 IU/ML
THYROGLOB AB SERPL-ACNC: <20 IU/ML
THYROGLOB SERPL-MCNC: 40.2 NG/ML
THYROPEROXIDASE AB SERPL IA-ACNC: 13.9 IU/ML
TSH SERPL-ACNC: 0.99 UIU/ML

## 2024-02-21 ENCOUNTER — RX RENEWAL (OUTPATIENT)
Age: 39
End: 2024-02-21

## 2024-02-26 ENCOUNTER — APPOINTMENT (OUTPATIENT)
Dept: ENDOCRINOLOGY | Facility: CLINIC | Age: 39
End: 2024-02-26

## 2024-03-07 ENCOUNTER — APPOINTMENT (OUTPATIENT)
Dept: SURGERY | Facility: CLINIC | Age: 39
End: 2024-03-07
Payer: MEDICARE

## 2024-03-07 ENCOUNTER — LABORATORY RESULT (OUTPATIENT)
Age: 39
End: 2024-03-07

## 2024-03-07 DIAGNOSIS — C73 MALIGNANT NEOPLASM OF THYROID GLAND: ICD-10-CM

## 2024-03-07 PROCEDURE — 36415 COLL VENOUS BLD VENIPUNCTURE: CPT

## 2024-03-07 PROCEDURE — 99214 OFFICE O/P EST MOD 30 MIN: CPT | Mod: 25

## 2024-03-07 NOTE — HISTORY OF PRESENT ILLNESS
[de-identified] : 9 years s/p total thyroidectomy with bilateral neck dissection for malignancy. feels well on Synthroid 137 mcg   7 1/2 doses weekly. denies dysphagia, hoarseness or new lesions. no changes medically since last visit.  last CT showed stable pulmonary nodules, osteopenia.  I have reviewed all old and new data and available images.  Additional information was obtained from others present at the time of visit to ensure the completeness of the history.  treatment of osteoporosis per dr catalan, although treatment deferred at this time due to dental issues

## 2024-03-07 NOTE — ASSESSMENT
[FreeTextEntry1] : will observe.  bloods drawn. to call next week for results.  to return earlier if any change.  patient has been given the opportunity to ask questions, and all of the patient's questions have been answered to their satisfaction.  chest CT next visit.  recommended use of colace for treatment of ongoing constipation.

## 2024-03-07 NOTE — PHYSICAL EXAM
[de-identified] : changes from prior surgery [de-identified] : no palpable thyroid bed nodules [Laryngoscopy Performed] : laryngoscopy was performed, see procedure section for findings [Midline] : located in midline position [Normal] : cranial nerves 2-12 intact [de-identified] : 1.2 cm right upper jugular lymph node, well circumscribed and mobile and soft and stable

## 2024-03-11 LAB
T3 SERPL-MCNC: 123 NG/DL
T4 FREE SERPL-MCNC: 1.8 NG/DL
THYROGLOB AB SERPL-ACNC: <20 IU/ML
THYROGLOB SERPL-MCNC: 46.3 NG/ML
TSH SERPL-ACNC: 1.01 UIU/ML

## 2024-05-13 RX ORDER — LEVOTHYROXINE SODIUM 0.14 MG/1
137 TABLET ORAL
Qty: 90 | Refills: 3 | Status: ACTIVE | COMMUNITY
Start: 2020-08-04 | End: 1900-01-01

## 2024-09-17 ENCOUNTER — LABORATORY RESULT (OUTPATIENT)
Age: 39
End: 2024-09-17

## 2024-09-17 ENCOUNTER — APPOINTMENT (OUTPATIENT)
Dept: SURGERY | Facility: CLINIC | Age: 39
End: 2024-09-17
Payer: MEDICARE

## 2024-09-17 DIAGNOSIS — C73 MALIGNANT NEOPLASM OF THYROID GLAND: ICD-10-CM

## 2024-09-17 PROCEDURE — 36415 COLL VENOUS BLD VENIPUNCTURE: CPT

## 2024-09-17 PROCEDURE — 99214 OFFICE O/P EST MOD 30 MIN: CPT | Mod: 25

## 2024-09-17 NOTE — HISTORY OF PRESENT ILLNESS
[de-identified] : 9 1/2 years s/p total thyroidectomy with bilateral neck dissection for malignancy. feels well on Synthroid 137 mcg   7 1/2 doses weekly. denies dysphagia, hoarseness or new lesions. no changes medically since last visit.  recent CT showed stable pulmonary nodules.  I have reviewed all old and new data and available images.  Additional information was obtained from others present at the time of visit to ensure the completeness of the history.  treatment of osteoporosis per dr catalan

## 2024-09-17 NOTE — ASSESSMENT
[FreeTextEntry1] : will observe.  bloods drawn. to call next week for results.  to return earlier if any change.  patient has been given the opportunity to ask questions, and all of the patient's questions have been answered to their satisfaction.  chest CT next year

## 2024-09-17 NOTE — PHYSICAL EXAM
[de-identified] : changes from prior surgery [de-identified] : no palpable thyroid bed nodules [Laryngoscopy Performed] : laryngoscopy was performed, see procedure section for findings [Midline] : located in midline position [Normal] : orientation to person, place, and time: normal [de-identified] : 1.2 cm right upper jugular lymph node, well circumscribed and mobile and soft and stable

## 2024-09-18 LAB
T3 SERPL-MCNC: 116 NG/DL
T4 FREE SERPL-MCNC: 1.7 NG/DL
THYROGLOB AB SERPL-ACNC: 16 IU/ML
THYROGLOB SERPL-MCNC: 31.5 NG/ML
TSH SERPL-ACNC: 0.04 UIU/ML

## 2025-01-21 ENCOUNTER — RX RENEWAL (OUTPATIENT)
Age: 40
End: 2025-01-21

## 2025-03-18 ENCOUNTER — APPOINTMENT (OUTPATIENT)
Dept: SURGERY | Facility: CLINIC | Age: 40
End: 2025-03-18
Payer: MEDICARE

## 2025-03-18 ENCOUNTER — LABORATORY RESULT (OUTPATIENT)
Age: 40
End: 2025-03-18

## 2025-03-18 PROCEDURE — 99214 OFFICE O/P EST MOD 30 MIN: CPT | Mod: 25

## 2025-03-18 PROCEDURE — 36415 COLL VENOUS BLD VENIPUNCTURE: CPT

## 2025-03-19 LAB
T3 SERPL-MCNC: 109 NG/DL
T4 FREE SERPL-MCNC: 1.7 NG/DL
THYROGLOB AB SERPL-ACNC: <15 IU/ML
THYROGLOB SERPL-MCNC: 28.3 NG/ML
TSH SERPL-ACNC: 0.28 UIU/ML

## 2025-03-24 DIAGNOSIS — C73 MALIGNANT NEOPLASM OF THYROID GLAND: ICD-10-CM

## 2025-04-03 ENCOUNTER — APPOINTMENT (OUTPATIENT)
Dept: ULTRASOUND IMAGING | Facility: IMAGING CENTER | Age: 40
End: 2025-04-03

## 2025-04-03 ENCOUNTER — RESULT REVIEW (OUTPATIENT)
Age: 40
End: 2025-04-03

## 2025-04-03 ENCOUNTER — OUTPATIENT (OUTPATIENT)
Dept: OUTPATIENT SERVICES | Facility: HOSPITAL | Age: 40
LOS: 1 days | End: 2025-04-03
Payer: MEDICARE

## 2025-04-03 DIAGNOSIS — Z98.89 OTHER SPECIFIED POSTPROCEDURAL STATES: Chronic | ICD-10-CM

## 2025-04-03 DIAGNOSIS — C73 MALIGNANT NEOPLASM OF THYROID GLAND: ICD-10-CM

## 2025-04-03 PROCEDURE — 88341 IMHCHEM/IMCYTCHM EA ADD ANTB: CPT

## 2025-04-03 PROCEDURE — 88172 CYTP DX EVAL FNA 1ST EA SITE: CPT

## 2025-04-03 PROCEDURE — 88341 IMHCHEM/IMCYTCHM EA ADD ANTB: CPT | Mod: 26

## 2025-04-03 PROCEDURE — 88173 CYTOPATH EVAL FNA REPORT: CPT | Mod: 26

## 2025-04-03 PROCEDURE — 10005 FNA BX W/US GDN 1ST LES: CPT

## 2025-04-03 PROCEDURE — 38505 NEEDLE BIOPSY LYMPH NODES: CPT

## 2025-04-03 PROCEDURE — 88342 IMHCHEM/IMCYTCHM 1ST ANTB: CPT | Mod: 26

## 2025-04-03 PROCEDURE — 88342 IMHCHEM/IMCYTCHM 1ST ANTB: CPT

## 2025-04-03 PROCEDURE — 76942 ECHO GUIDE FOR BIOPSY: CPT

## 2025-04-03 PROCEDURE — 88305 TISSUE EXAM BY PATHOLOGIST: CPT

## 2025-04-03 PROCEDURE — 88173 CYTOPATH EVAL FNA REPORT: CPT

## 2025-04-03 PROCEDURE — 88305 TISSUE EXAM BY PATHOLOGIST: CPT | Mod: 26

## 2025-04-09 LAB — NON-GYNECOLOGICAL CYTOLOGY STUDY: SIGNIFICANT CHANGE UP

## 2025-04-10 DIAGNOSIS — C73 MALIGNANT NEOPLASM OF THYROID GLAND: ICD-10-CM

## 2025-09-16 ENCOUNTER — LABORATORY RESULT (OUTPATIENT)
Age: 40
End: 2025-09-16

## 2025-09-16 ENCOUNTER — APPOINTMENT (OUTPATIENT)
Dept: SURGERY | Facility: CLINIC | Age: 40
End: 2025-09-16
Payer: MEDICARE

## 2025-09-16 DIAGNOSIS — C73 MALIGNANT NEOPLASM OF THYROID GLAND: ICD-10-CM

## 2025-09-16 PROCEDURE — 36415 COLL VENOUS BLD VENIPUNCTURE: CPT

## 2025-09-16 PROCEDURE — 99214 OFFICE O/P EST MOD 30 MIN: CPT | Mod: 25

## 2025-09-17 LAB
T3 SERPL-MCNC: 114 NG/DL
T4 FREE SERPL-MCNC: 1.7 NG/DL
THYROGLOB AB SERPL-ACNC: 19.3 IU/ML
THYROGLOB SERPL-MCNC: 24.4 NG/ML
TSH SERPL-ACNC: 0.58 UIU/ML